# Patient Record
Sex: FEMALE
[De-identification: names, ages, dates, MRNs, and addresses within clinical notes are randomized per-mention and may not be internally consistent; named-entity substitution may affect disease eponyms.]

---

## 2018-03-13 ENCOUNTER — RESULT REVIEW (OUTPATIENT)
Age: 73
End: 2018-03-13

## 2021-04-23 ENCOUNTER — NON-APPOINTMENT (OUTPATIENT)
Age: 76
End: 2021-04-23

## 2021-04-30 ENCOUNTER — APPOINTMENT (OUTPATIENT)
Dept: OTOLARYNGOLOGY | Facility: CLINIC | Age: 76
End: 2021-04-30

## 2021-06-10 PROBLEM — Z00.00 ENCOUNTER FOR PREVENTIVE HEALTH EXAMINATION: Status: ACTIVE | Noted: 2021-06-10

## 2021-07-06 ENCOUNTER — APPOINTMENT (OUTPATIENT)
Dept: OTOLARYNGOLOGY | Facility: CLINIC | Age: 76
End: 2021-07-06

## 2024-05-23 ENCOUNTER — APPOINTMENT (OUTPATIENT)
Dept: OTOLARYNGOLOGY | Facility: CLINIC | Age: 79
End: 2024-05-23
Payer: MEDICARE

## 2024-05-23 DIAGNOSIS — J34.89 OTHER SPECIFIED DISORDERS OF NOSE AND NASAL SINUSES: ICD-10-CM

## 2024-05-23 DIAGNOSIS — M95.0 ACQUIRED DEFORMITY OF NOSE: ICD-10-CM

## 2024-05-23 DIAGNOSIS — J34.2 DEVIATED NASAL SEPTUM: ICD-10-CM

## 2024-05-23 PROCEDURE — 99203 OFFICE O/P NEW LOW 30 MIN: CPT | Mod: 25

## 2024-05-23 PROCEDURE — 31231 NASAL ENDOSCOPY DX: CPT | Mod: 52

## 2024-05-23 NOTE — ASSESSMENT
[FreeTextEntry1] : 78F here for initial evaluation. Over the past 2 months or so, she c/o feeling something blocking her left nostril with nasal obstruction. The right side is also affected but not as much as the left. There is no pain, drainage, bleeding or change in olfaction. There is no recent trauma. On exam, there is marked improvement in nasal breathing on brandy maneuver. Nasal endoscopy shows left sided septal deviation. The rest of the head and neck exam is unremarkable. 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 These are both chronic issues and have been there for years, so not exactly sure why she is only c/o this recently. Regardless, the exam is otherwise normal. Can try breathe-right strips or nasal cones. RTO as needed.

## 2024-05-23 NOTE — CONSULT LETTER
[Dear  ___] : Dear  [unfilled], [Courtesy Letter:] : I had the pleasure of seeing your patient, [unfilled], in my office today. [Consult Closing:] : Thank you very much for allowing me to participate in the care of this patient.  If you have any questions, please do not hesitate to contact me. [Sincerely,] : Sincerely, [FreeTextEntry3] : Sachin Hoffmann MD Department of Otolaryngology, Head and Neck Surgery

## 2024-05-23 NOTE — HISTORY OF PRESENT ILLNESS
[de-identified] : 78F here for initial evaluation.  Over the past 2 months or so, she c/o feeling something blocking her left nostril with nasal obstruction. The right side is also affected but not as much as the left. There is no pain, drainage, bleeding or change in olfaction. There is no recent trauma.  ROS otherwise unremarkable.

## 2024-05-23 NOTE — PROCEDURE
[FreeTextEntry3] : +marked improvement in nasal breathing on brandy maneuver  Nasal Endoscopy: left sided septal deviation nasal airways patent no masses/lesions no mucopus or polyps choana clear